# Patient Record
Sex: MALE | Race: WHITE
[De-identification: names, ages, dates, MRNs, and addresses within clinical notes are randomized per-mention and may not be internally consistent; named-entity substitution may affect disease eponyms.]

---

## 2018-05-26 ENCOUNTER — HOSPITAL ENCOUNTER (OUTPATIENT)
Dept: HOSPITAL 17 - NEPE | Age: 78
Setting detail: OBSERVATION
LOS: 1 days | Discharge: HOME | End: 2018-05-27
Attending: HOSPITALIST | Admitting: HOSPITALIST
Payer: MEDICARE

## 2018-05-26 VITALS — WEIGHT: 131.18 LBS | HEIGHT: 64 IN | BODY MASS INDEX: 22.39 KG/M2

## 2018-05-26 VITALS
OXYGEN SATURATION: 97 % | DIASTOLIC BLOOD PRESSURE: 71 MMHG | HEART RATE: 98 BPM | TEMPERATURE: 98.1 F | SYSTOLIC BLOOD PRESSURE: 137 MMHG | RESPIRATION RATE: 20 BRPM

## 2018-05-26 VITALS
TEMPERATURE: 99 F | SYSTOLIC BLOOD PRESSURE: 152 MMHG | DIASTOLIC BLOOD PRESSURE: 77 MMHG | RESPIRATION RATE: 16 BRPM | OXYGEN SATURATION: 96 % | HEART RATE: 106 BPM

## 2018-05-26 VITALS
DIASTOLIC BLOOD PRESSURE: 62 MMHG | RESPIRATION RATE: 19 BRPM | OXYGEN SATURATION: 98 % | HEART RATE: 80 BPM | SYSTOLIC BLOOD PRESSURE: 97 MMHG

## 2018-05-26 VITALS
HEART RATE: 89 BPM | SYSTOLIC BLOOD PRESSURE: 121 MMHG | OXYGEN SATURATION: 99 % | DIASTOLIC BLOOD PRESSURE: 69 MMHG | RESPIRATION RATE: 18 BRPM | TEMPERATURE: 98 F

## 2018-05-26 VITALS
RESPIRATION RATE: 18 BRPM | OXYGEN SATURATION: 99 % | DIASTOLIC BLOOD PRESSURE: 55 MMHG | SYSTOLIC BLOOD PRESSURE: 110 MMHG | TEMPERATURE: 99.2 F | HEART RATE: 100 BPM

## 2018-05-26 VITALS
OXYGEN SATURATION: 95 % | SYSTOLIC BLOOD PRESSURE: 112 MMHG | DIASTOLIC BLOOD PRESSURE: 56 MMHG | RESPIRATION RATE: 19 BRPM | HEART RATE: 89 BPM

## 2018-05-26 VITALS
SYSTOLIC BLOOD PRESSURE: 98 MMHG | RESPIRATION RATE: 18 BRPM | OXYGEN SATURATION: 97 % | HEART RATE: 86 BPM | DIASTOLIC BLOOD PRESSURE: 57 MMHG

## 2018-05-26 DIAGNOSIS — I25.10: ICD-10-CM

## 2018-05-26 DIAGNOSIS — R73.03: ICD-10-CM

## 2018-05-26 DIAGNOSIS — I50.9: ICD-10-CM

## 2018-05-26 DIAGNOSIS — R07.89: Primary | ICD-10-CM

## 2018-05-26 DIAGNOSIS — R07.2: ICD-10-CM

## 2018-05-26 DIAGNOSIS — I25.2: ICD-10-CM

## 2018-05-26 DIAGNOSIS — Z95.1: ICD-10-CM

## 2018-05-26 DIAGNOSIS — G12.1: ICD-10-CM

## 2018-05-26 DIAGNOSIS — Z88.8: ICD-10-CM

## 2018-05-26 DIAGNOSIS — I48.0: ICD-10-CM

## 2018-05-26 DIAGNOSIS — R82.99: ICD-10-CM

## 2018-05-26 DIAGNOSIS — R06.02: ICD-10-CM

## 2018-05-26 DIAGNOSIS — B96.89: ICD-10-CM

## 2018-05-26 DIAGNOSIS — I11.0: ICD-10-CM

## 2018-05-26 DIAGNOSIS — Z95.3: ICD-10-CM

## 2018-05-26 DIAGNOSIS — D72.829: ICD-10-CM

## 2018-05-26 DIAGNOSIS — I48.2: ICD-10-CM

## 2018-05-26 DIAGNOSIS — R06.09: ICD-10-CM

## 2018-05-26 DIAGNOSIS — Z95.5: ICD-10-CM

## 2018-05-26 DIAGNOSIS — E78.5: ICD-10-CM

## 2018-05-26 DIAGNOSIS — I73.9: ICD-10-CM

## 2018-05-26 DIAGNOSIS — Z79.01: ICD-10-CM

## 2018-05-26 DIAGNOSIS — E78.00: ICD-10-CM

## 2018-05-26 LAB
ALBUMIN SERPL-MCNC: 4.1 GM/DL (ref 3.4–5)
ALP SERPL-CCNC: 62 U/L (ref 45–117)
ALT SERPL-CCNC: 23 U/L (ref 12–78)
AST SERPL-CCNC: 29 U/L (ref 15–37)
BASOPHILS # BLD AUTO: 0 TH/MM3 (ref 0–0.2)
BASOPHILS NFR BLD: 0.2 % (ref 0–2)
BILIRUB SERPL-MCNC: 2.3 MG/DL (ref 0.2–1)
BUN SERPL-MCNC: 12 MG/DL (ref 7–18)
CALCIUM SERPL-MCNC: 9.3 MG/DL (ref 8.5–10.1)
CHLORIDE SERPL-SCNC: 98 MEQ/L (ref 98–107)
CREAT SERPL-MCNC: 0.77 MG/DL (ref 0.6–1.3)
EOSINOPHIL # BLD: 0 TH/MM3 (ref 0–0.4)
EOSINOPHIL NFR BLD: 0.1 % (ref 0–4)
ERYTHROCYTE [DISTWIDTH] IN BLOOD BY AUTOMATED COUNT: 14.7 % (ref 11.6–17.2)
GFR SERPLBLD BASED ON 1.73 SQ M-ARVRAT: 98 ML/MIN (ref 89–?)
GLUCOSE SERPL-MCNC: 139 MG/DL (ref 74–106)
HCO3 BLD-SCNC: 27.2 MEQ/L (ref 21–32)
HCT VFR BLD CALC: 37.1 % (ref 39–51)
HGB BLD-MCNC: 12.8 GM/DL (ref 13–17)
INR PPP: 1.1 RATIO
LYMPHOCYTES # BLD AUTO: 0.5 TH/MM3 (ref 1–4.8)
LYMPHOCYTES NFR BLD AUTO: 3.6 % (ref 9–44)
MAGNESIUM SERPL-MCNC: 2.2 MG/DL (ref 1.5–2.5)
MCH RBC QN AUTO: 29 PG (ref 27–34)
MCHC RBC AUTO-ENTMCNC: 34.4 % (ref 32–36)
MCV RBC AUTO: 84.5 FL (ref 80–100)
MONOCYTE #: 2.5 TH/MM3 (ref 0–0.9)
MONOCYTES NFR BLD: 17.4 % (ref 0–8)
NEUTROPHILS # BLD AUTO: 11.6 TH/MM3 (ref 1.8–7.7)
NEUTROPHILS NFR BLD AUTO: 78.7 % (ref 16–70)
PLATELET # BLD: 202 TH/MM3 (ref 150–450)
PMV BLD AUTO: 8.1 FL (ref 7–11)
PROT SERPL-MCNC: 8 GM/DL (ref 6.4–8.2)
PROTHROMBIN TIME: 11.4 SEC (ref 9.8–11.6)
RBC # BLD AUTO: 4.4 MIL/MM3 (ref 4.5–5.9)
SODIUM SERPL-SCNC: 137 MEQ/L (ref 136–145)
TROPONIN I SERPL-MCNC: (no result) NG/ML (ref 0.02–0.05)
TROPONIN I SERPL-MCNC: (no result) NG/ML (ref 0.02–0.05)
WBC # BLD AUTO: 14.7 TH/MM3 (ref 4–11)

## 2018-05-26 PROCEDURE — G0378 HOSPITAL OBSERVATION PER HR: HCPCS

## 2018-05-26 PROCEDURE — 87186 SC STD MICRODIL/AGAR DIL: CPT

## 2018-05-26 PROCEDURE — 82552 ASSAY OF CPK IN BLOOD: CPT

## 2018-05-26 PROCEDURE — 87086 URINE CULTURE/COLONY COUNT: CPT

## 2018-05-26 PROCEDURE — 85027 COMPLETE CBC AUTOMATED: CPT

## 2018-05-26 PROCEDURE — 82550 ASSAY OF CK (CPK): CPT

## 2018-05-26 PROCEDURE — 85730 THROMBOPLASTIN TIME PARTIAL: CPT

## 2018-05-26 PROCEDURE — 71045 X-RAY EXAM CHEST 1 VIEW: CPT

## 2018-05-26 PROCEDURE — 87077 CULTURE AEROBIC IDENTIFY: CPT

## 2018-05-26 PROCEDURE — 85025 COMPLETE CBC W/AUTO DIFF WBC: CPT

## 2018-05-26 PROCEDURE — 84484 ASSAY OF TROPONIN QUANT: CPT

## 2018-05-26 PROCEDURE — 83735 ASSAY OF MAGNESIUM: CPT

## 2018-05-26 PROCEDURE — 80048 BASIC METABOLIC PNL TOTAL CA: CPT

## 2018-05-26 PROCEDURE — 81001 URINALYSIS AUTO W/SCOPE: CPT

## 2018-05-26 PROCEDURE — 85610 PROTHROMBIN TIME: CPT

## 2018-05-26 PROCEDURE — 93005 ELECTROCARDIOGRAM TRACING: CPT

## 2018-05-26 PROCEDURE — 80053 COMPREHEN METABOLIC PANEL: CPT

## 2018-05-26 RX ADMIN — METOPROLOL TARTRATE SCH MG: 25 TABLET, FILM COATED ORAL at 23:00

## 2018-05-26 RX ADMIN — APIXABAN SCH MG: 5 TABLET, FILM COATED ORAL at 23:00

## 2018-05-26 RX ADMIN — STANDARDIZED SENNA CONCENTRATE AND DOCUSATE SODIUM SCH TAB: 8.6; 5 TABLET, FILM COATED ORAL at 21:00

## 2018-05-26 RX ADMIN — RANOLAZINE SCH MG: 500 TABLET, FILM COATED, EXTENDED RELEASE ORAL at 23:00

## 2018-05-26 RX ADMIN — Medication SCH ML: at 23:00

## 2018-05-26 NOTE — MB
cc:

Arun Davis MD

****

 

 

DATE:

05/26/2018

 

REASON FOR CONSULTATION:

Chest pain.

 

HISTORY OF PRESENT ILLNESS:

The patient is a 77-year-old white male, followed in our office by Dr. Carroll Randolph, with a history of paroxysmal atrial fibrillation, 

coronary artery disease, diabetes, hypertension, hyperlipidemia, 

aortic valve replacement, and peripheral vascular disease, who was in 

his usual state of health up until 2 days prior to admission when he 

began to experience intermittent episodes of left parasternal chest 

discomfort.  The patient states he has had intermittent similar chest 

discomforts for the past couple of years, but almost always relieved 

with 0 to 1 sublingual nitroglycerin.  However, in the last 2 days, he

has had to take multiple sublingual nitroglycerin, so he came to the 

emergency room for further evaluation and treatment.  Today, he feels 

"just fine," without any further chest discomfort.  He denies 

pleurisy, shortness of breath, dizziness, syncope, near syncope, 

palpitations, pedal edema, and paroxysmal nocturnal dyspnea.

 

PAST MEDICAL HISTORY:

1.  Paroxysmal atrial fibrillation, status post ablation in 2012.

2.  Coronary artery disease, status post bypass surgery in 1995 with a

left internal mammary artery to the LAD and 3 separate vein grafts to 

the obtuse marginal, diagonal, and posterior descending artery.  The 

patient is status post stent of the proximal vein graft to the right 

coronary artery, 06/13/2012, as well as very difficult and complicated

angioplasty of the anastomosis site of the vein graft to the obtuse 

marginal, 07/20/2016, at which time stenting was unsuccessful.

3.  Borderline diabetes.

4.  Hypertension.

5.  Hyperlipidemia.

6.  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

severe aortic stenosis.

7.  Peripheral vascular disease with remote history of left iliac 

stent.

 

CARDIAC MEDICATIONS AT HOME:

1.  Crestor 40 mg at bedtime.

2.  Plavix 75 mg daily.

3.  Metoprolol tartrate 25 mg b.i.d.

4.  Imdur 60 mg daily.

5.  Amlodipine 2.5 mg daily.

6.  Aspirin 81 mg daily.

7.  Sublingual nitroglycerin p.r.n.

 

ALLERGIES:

MORPHINE.

 

FAMILY HISTORY:

Noncontributory.

 

SOCIAL HISTORY:

The patient quit smoking more than 20 years ago.  He denies alcohol 

abuse.

 

REVIEW OF SYSTEMS:

As in the history of present illness, otherwise negative or 

noncontributory.  He also denies headache, abdominal pain, melena, 

dyspepsia, flu symptoms, cough, and fevers.

 

PHYSICAL EXAMINATION:

VITAL SIGNS:  His blood pressure 97/62 with a pulse of 80, 

respirations 19.

GENERAL:  He is a well-developed, thin white male, in no acute 

distress.

NECK:  Jugular venous pressure is normal.  Carotid pulses are 2+ 

bilaterally and without bruits.

CHEST:  Reveals diminished breath sounds diffusely.

CARDIAC:  He has an irregularly irregular rhythm with a grade II/VI 

systolic ejection murmur heard at the base of the heart.  The S2 heart

sound is normal.  No gallop is audible.

ABDOMEN:  He has a soft, nontender abdomen.  Bowel sounds are present.

 There is no definite hepatosplenomegaly.

EXTREMITIES:  Reveals no clubbing, cyanosis or edema.

 

DIAGNOSTIC STUDIES:

EKG is currently not in the computer and I am unable to locate it in 

the emergency department.

 

Chest x-ray shows no acute disease.

 

LABORATORY DATA:

Includes WBC 14.7, hemoglobin 12.8, platelets 202.  Potassium 3.8, BUN

12, creatinine 0.77.  Troponin less than 0.02.  , CK-MB 7.1.

 

IMPRESSION:

Possible unstable angina, recurrent atrial fibrillation in this 

77-year-old white male with a history of paroxysmal atrial 

fibrillation status post ablation in 2012, coronary artery disease 

status post bypass surgery in 1995, hypertension, aortic valve 

replacement, and peripheral vascular disease.  At this time, he is 

chest pain free.  Initial cardiac enzymes are overall negative for 

myocardial infarction.  His symptoms are overall suggestive of 

recurrent angina in an unstable pattern.  Unfortunately, his EKG is 

not yet in the computer and I am unable to locate it in the emergency 

department.  It reportedly shows atrial fibrillation.  His heart rate 

at this time is under control.  The patient's thromboembolic risk is 

overall at least moderately increased.

 

With respect to his medical therapy, his blood pressures most recently

are somewhat too low to intensify his antianginal regimen  except for 

possibly adding Ranexa.  From what I can gather, his last percutaneous

graft intervention 2 years ago was extremely difficult and complicated

due to angulation of the native obtuse marginal with the graft.  The 

patient does at this time wish conservative therapy as possible.

 

RECOMMENDATIONS:

1.  Start Eliquis 5 mg b.i.d. providing his EKG does indeed show 

recurrent atrial fibrillation.

2.  Continue daily baby aspirin and stop his Plavix.

3.  Consider the addition of Ranexa.

4.  Depending on his hospital course, consider repeat cardiac 

catheterization, although at this time, the patient seems to desire 

conservative therapy.

 

 

__________________________________

MD ROBBIN Riley/MERI

D: 05/26/2018, 03:22 PM

T: 05/26/2018, 03:47 PM

Visit #: [id number]

Job #: [id number]

KEVIN

## 2018-05-26 NOTE — RADRPT
EXAM DATE:  5/26/2018 11:58 AM EDT

AGE/SEX:        77 years / Male



INDICATIONS:  Shortness of breath and mid-chest pain.



CLINICAL DATA:  This is the patient's initial encounter. Patient reports that signs and symptoms have
 been present for 2 days and indicates a pain score of 6/10. 

                                                                          

MEDICAL/SURGICAL HISTORY:       None. CABG. Valve replacement.



[place]      [facility] [place].  .



FINDINGS:  

A single AP view of the chest demonstrates the lungs to be symmetrically aerated without evidence of 
mass, infiltrate or effusion.  The heart size is stable. There is evidence of previous cardiothoracic
 surgery..  Osseous structures are intact.  No new or significant changes.





CONCLUSION: 

No acute intrathoracic disease. Stable examination.



Electronically signed by: Jozef Vásquez MD  5/26/2018 11:59 AM EDT

## 2018-05-26 NOTE — HHI.PR
Subjective


Remarks


F/U UA. Pt wishes conservative mgt per cards





Objective


Vitals





Vital Signs








  Date Time  Temp Pulse Resp B/P (MAP) Pulse Ox O2 Delivery O2 Flow Rate FiO2


 


5/26/18 23:14 99.0 106 16 152/77 (102) 96   


 


5/26/18 19:15 99.2 100 18 110/55 (73) 99   


 


5/26/18 15:50 98.0 89 18 121/69 (86) 99   


 


5/26/18 14:43        


 


5/26/18 14:00  80 19 97/62 (74) 98 Room Air  


 


5/26/18 12:00  86 18 98/57 (71) 97 Room Air  


 


5/26/18 11:26     95 Room Air  


 


5/26/18 11:26  89 19 112/56 (74) 95 Room Air  


 


5/26/18 11:13  97 20  95 Room Air  


 


5/26/18 10:54 98.1 98 20 137/71 (93) 97   








Result Diagram:  


5/26/18 1115                                                                   

             5/26/18 1115





Imaging





Last Impressions








Chest X-Ray 5/26/18 1119 Signed





Impressions: 





 CONCLUSION: 





 No acute intrathoracic disease. Stable examination.





  





 








Objective Remarks


GENERAL: Elderly male, laying in bed appears comfortable.


SKIN: No rashes, ecchymoses or lesions. Cool and dry.


HEAD: Atraumatic. Normocephalic. No temporal or scalp tenderness.


EYES: Pupils equal round and reactive. Extraocular motions intact. No scleral 

icterus. No injection or drainage. 


ENT: Nose without drainage. Airway patent.


NECK: Trachea midline. 


CARDIOVASCULAR: Regular rate and rhythm with 2/6 systolic ejection murmur 


RESPIRATORY: Clear to auscultation. Breath sounds equal bilaterally. No wheezes


GASTROINTESTINAL: Abdomen soft, non-tender, nondistended.  No guarding.


MUSCULOSKELETAL: Extremities without edema.5/5 muscle strength in upper and 

lower extremities.  He does have decreased sensation in his lower extremities 

which is chronic for him.


NEUROLOGICAL: Awake and alert.  He does have a muffled voice which is chronic 

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


Procedures


none





A/P


Problem List:  


(1) Chest pain


ICD Code:  R07.9 - Chest pain, unspecified


Status:  Acute


Assessment and Plan


Chest pain/UA: Patient with extensive CAD status post CABG 2 and multiple 

cardiac catheterizations. Med mgt per cards as pt wishes conservative mgt. Ct 

beta blocker and aspirin.  Ranexa added





Patient denies any history of hypertension, however, per med rec he does have a 

history and he is on amlodipine at home.  Monitor.  Vasotec as needed





hyperlipidemia.  Patient statin was resumed.





Fib with CVR on BB. Eliquis started by cards





PAD: Asa dc plavix per cards since Elquis added





Leukocytosis: No signs of infection, or upper respiratory symptoms.  Check UA.  

Chest x-ray negative.





DVT prophylaxis: Lovenox





Problem Qualifiers





(1) Chest pain:  


Qualified Codes:  R07.9 - Chest pain, unspecified








Kendrick Bullard MD May 26, 2018 23:40

## 2018-05-26 NOTE — PD
HPI


Chief Complaint:  Cardiac Complaint


Time Seen by Provider:  11:08


Travel History


International Travel<30 days:  No


Contact w/Intl Traveler<30days:  No


Traveled to known affect area:  No





History of Present Illness


HPI


The patient is a 77-year-old  male who presents to emergency 

department with chest pain and exertional dyspnea.  The patient notes 2-3 day 

history of increasing substernal chest pain, described as pressure, nonradiating

, worse with activity and alleviated at rest.  He also notes increase in 

exertional shortness of breath.  The patient does have a history of previous 

CABG and stent placement, is followed by his cardiologist, Dr. Carroll Randolph.  

The patient's primary physician is Dr. Feliciano Morillo.  The patient denies 

any nausea, vomiting, or diaphoresis.  He denies any significant lower 

extremity edema.  The patient does have a history of hypertension, 

hyperlipidemia, CAD with previous CABG and stent placement, as well as a 

history of peripheral vascular disease.  Symptoms are moderate.  He denies any 

history of diabetes.





PFSH


Past Medical History


Arthritis:  No


Asthma:  No


Autoimmune Disease:  No


Blood Disorders:  No


Anxiety:  No


Depression:  No


Heart Rhythm Problems:  No


Cancer:  No


Cardiac Catheterization:  Yes


Cardiovascular Problems:  Yes


High Cholesterol:  Yes


Chemotherapy:  No


Chest Pain:  No


Congestive Heart Failure:  Yes


COPD:  No


Cerebrovascular Accident:  No


Diminished Hearing:  No


Endocrine:  Yes


Gastrointestinal Disorders:  Yes (APPENDECTOMY 1964)


GERD:  No


Glaucoma:  No


Genitourinary:  No


Headaches:  No


Hepatitis:  No


Hiatal Hernia:  No


Hypertension:  Yes


Immune Disorder:  No


Kidney Stones:  No


Musculoskeletal:  Yes


Neurologic:  No


Psychiatric:  No


Reproductive:  No


Respiratory:  No


Integumentary:  No


Migraines:  No


Myocardial Infarction:  Yes (1984 AND 2008)


Radiation Therapy:  No


Renal Failure:  No


Seizures:  No


Sickle Cell Disease:  No


Sleep Apnea:  No


Thyroid Disease:  No


Ulcer:  No





Past Surgical History


Abdominal Surgery:  Yes (APPENDECTOMY 1964)


AICD:  No


Appendectomy:  Yes


Arteriovenous Shunt:  No


Body Medical Devices:  STENT LEFT LEG


Cardiac Surgery:  Yes (FEMORAL BYPASS)


Cholecystectomy:  No


Coronary Artery Bypass Graft:  Yes


Ear Surgery:  No


Endocrine Surgery:  No


Eye Surgery:  Yes (BILAT. CATARACTS WITH IOL'S 2008)


Genitourinary Surgery:  No


Gynecologic Surgery:  No


Insulin Pump:  No


Joint Replacement:  No


Neurologic Surgery:  Yes (LAMINECTOMY 1971 AND 1973)


Oral Surgery:  No


Pacemaker:  No


Thoracic Surgery:  Yes (CABG)


Other Surgery:  Yes (APPENDECTOMY,BACK,FEM-POP, ABLATION)





Social History


Alcohol Use:  Yes (occas)


Tobacco Use:  No (quit 20 yrs ago)


Substance Use:  No





Allergies-Medications


(Allergen,Severity, Reaction):  


Coded Allergies:  


     morphine (Unverified  Allergy, Mild, 5/26/18)


Reported Meds & Prescriptions





Reported Meds & Active Scripts


Active


Reported


Aspirin 81 Mg Chew 81 Mg CHEW DAILY


Nitroglycerin SL (Nitroglycerin) 0.4 Mg Subl 0.4 Mg SL AS DIRECTED PRN


     ONE TABLET UNDER THE TONGUE AS NEEDED FOR CHEST PAIN, MAY REPEAT EVERY


     FIVE MINUTES FOR A TOTAL OF 3 DOSES OR CALL 911 IF NO RELIEF


Amlodipine (Amlodipine Besylate) 2.5 Mg Tab 2.5 Mg PO DAILY


Isosorbide Mononitrate ER (Isosorbide Mononitrate) 60 Mg Tab 60 Mg PO DAILY


Metoprolol Tartrate 25 Mg Tab 25 Mg PO BID


Plavix (Clopidogrel Bisulfate) 75 Mg Tab 75 Mg PO DAILY


Rosuvastatin (Rosuvastatin Calcium) 40 Mg Tab 40 Mg PO DAILY








Review of Systems


Except as stated in HPI:  all other systems reviewed are Neg


General / Constitutional:  No: Fever


HENT:  No: Lightheadedness


Cardiovascular:  Positive: Chest Pain or Discomfort, Dyspnea on exertion, No: 

Diaphoresis


Respiratory:  Positive: Shortness of Breath


Gastrointestinal:  No: Nausea, Vomiting, Abdominal Pain


Musculoskeletal:  No: Weakness, Edema


Neurologic:  No: Weakness, Dizziness





Physical Exam


Narrative


GENERAL: Awake, alert, pleasant 77-year-old male who appears his stated age and 

is in no acute respiratory distress.


SKIN: Focused skin assessment warm/dry.


HEAD: Atraumatic. Normocephalic. 


EYES: No injection or drainage.


ENT: No nasal bleeding or discharge.  Mucous membranes pink and moist.


NECK: Trachea midline. No JVD. 


CARDIOVASCULAR: Irregularly irregular.  Systolic murmur noted.  Well-healed 

midline sternal scar.


RESPIRATORY: No accessory muscle use. Clear to auscultation. Breath sounds 

equal bilaterally. 


GASTROINTESTINAL: Abdomen soft, non-tender, nondistended.  No rebound 

tenderness.


MUSCULOSKELETAL: No obvious deformities. No clubbing.  No cyanosis.  No edema. 


NEUROLOGICAL: Awake and alert. No obvious cranial nerve deficits.  Motor 

grossly within normal limits. Normal speech.


PSYCHIATRIC: Appropriate mood and affect; insight and judgment normal.





Data


Data


Last Documented VS





Vital Signs








  Date Time  Temp Pulse Resp B/P (MAP) Pulse Ox O2 Delivery O2 Flow Rate FiO2


 


5/26/18 11:26     95 Room Air  


 


5/26/18 11:26  89 19     


 


5/26/18 10:54 98.1       








Orders





 Orders


Electrocardiogram (5/26/18 11:19)


Ckmb (Isoenzyme) Profile (5/26/18 11:19)


Complete Blood Count With Diff (5/26/18 11:19)


Comprehensive Metabolic Panel (5/26/18 11:19)


Magnesium (Mg) (5/26/18 11:19)


Prothrombin Time / Inr (Pt) (5/26/18 11:19)


Act Partial Throm Time (Ptt) (5/26/18 11:19)


Troponin I (5/26/18 11:19)


Chest, Single Ap (5/26/18 11:19)


Ecg Monitoring (5/26/18 11:19)


Bilateral Bp Monitoring (5/26/18 11:19)


Iv Access Insert/Monitor (5/26/18 11:19)


Oximetry (5/26/18 11:19)


Oxygen Administration (5/26/18 11:19)


Aspirin Chew (Aspirin Chew) (5/26/18 11:30)


Sodium Chloride 0.9% Flush (Ns Flush) (5/26/18 11:30)


Aspirin Chew (Aspirin Chew) (5/26/18 12:00)


CKMB (5/26/18 11:15)


CKMB% (5/26/18 11:15)


Admit Order (Ed Use Only) (5/26/18 12:33)





Labs





Laboratory Tests








Test


  5/26/18


11:15


 


White Blood Count 14.7 TH/MM3 


 


Red Blood Count 4.40 MIL/MM3 


 


Hemoglobin 12.8 GM/DL 


 


Hematocrit 37.1 % 


 


Mean Corpuscular Volume 84.5 FL 


 


Mean Corpuscular Hemoglobin 29.0 PG 


 


Mean Corpuscular Hemoglobin


Concent 34.4 % 


 


 


Red Cell Distribution Width 14.7 % 


 


Platelet Count 202 TH/MM3 


 


Mean Platelet Volume 8.1 FL 


 


Neutrophils (%) (Auto) 78.7 % 


 


Lymphocytes (%) (Auto) 3.6 % 


 


Monocytes (%) (Auto) 17.4 % 


 


Eosinophils (%) (Auto) 0.1 % 


 


Basophils (%) (Auto) 0.2 % 


 


Neutrophils # (Auto) 11.6 TH/MM3 


 


Lymphocytes # (Auto) 0.5 TH/MM3 


 


Monocytes # (Auto) 2.5 TH/MM3 


 


Eosinophils # (Auto) 0.0 TH/MM3 


 


Basophils # (Auto) 0.0 TH/MM3 


 


CBC Comment AUTO DIFF 


 


Differential Comment


  AUTO DIFF


CONFIRMED


 


Prothrombin Time 11.4 SEC 


 


Prothromb Time International


Ratio 1.1 RATIO 


 


 


Activated Partial


Thromboplast Time 33.0 SEC 


 


 


Blood Urea Nitrogen 12 MG/DL 


 


Creatinine 0.77 MG/DL 


 


Random Glucose 139 MG/DL 


 


Total Protein 8.0 GM/DL 


 


Albumin 4.1 GM/DL 


 


Calcium Level 9.3 MG/DL 


 


Magnesium Level 2.2 MG/DL 


 


Alkaline Phosphatase 62 U/L 


 


Aspartate Amino Transf


(AST/SGOT) 29 U/L 


 


 


Alanine Aminotransferase


(ALT/SGPT) 23 U/L 


 


 


Total Bilirubin 2.3 MG/DL 


 


Sodium Level 137 MEQ/L 


 


Potassium Level 3.8 MEQ/L 


 


Chloride Level 98 MEQ/L 


 


Carbon Dioxide Level 27.2 MEQ/L 


 


Anion Gap 12 MEQ/L 


 


Estimat Glomerular Filtration


Rate 98 ML/MIN 


 


 


Total Creatine Kinase 206 U/L 


 


Creatine Kinase MB 7.1 NG/ML 


 


Troponin I


  LESS THAN 0.02


NG/ML











MDM


Medical Decision Making


Medical Screen Exam Complete:  Yes


Emergency Medical Condition:  Yes


Medical Record Reviewed:  Yes


Interpretation(s)


EKG reveals atrial fibrillation with a rate of 98.  Nonspecific T-wave changes.

  Wavy baseline noted.








Last Impressions








Chest X-Ray 5/26/18 1119 Signed





Impressions: 





 CONCLUSION: 





 No acute intrathoracic disease. Stable examination.





  





 








Laboratory Tests








Test


  5/26/18


11:15


 


White Blood Count 14.7 TH/MM3 


 


Red Blood Count 4.40 MIL/MM3 


 


Hemoglobin 12.8 GM/DL 


 


Hematocrit 37.1 % 


 


Mean Corpuscular Volume 84.5 FL 


 


Mean Corpuscular Hemoglobin 29.0 PG 


 


Mean Corpuscular Hemoglobin


Concent 34.4 % 


 


 


Red Cell Distribution Width 14.7 % 


 


Platelet Count 202 TH/MM3 


 


Mean Platelet Volume 8.1 FL 


 


Neutrophils (%) (Auto) 78.7 % 


 


Lymphocytes (%) (Auto) 3.6 % 


 


Monocytes (%) (Auto) 17.4 % 


 


Eosinophils (%) (Auto) 0.1 % 


 


Basophils (%) (Auto) 0.2 % 


 


Neutrophils # (Auto) 11.6 TH/MM3 


 


Lymphocytes # (Auto) 0.5 TH/MM3 


 


Monocytes # (Auto) 2.5 TH/MM3 


 


Eosinophils # (Auto) 0.0 TH/MM3 


 


Basophils # (Auto) 0.0 TH/MM3 


 


CBC Comment AUTO DIFF 


 


Differential Comment


  AUTO DIFF


CONFIRMED


 


Prothrombin Time 11.4 SEC 


 


Prothromb Time International


Ratio 1.1 RATIO 


 


 


Activated Partial


Thromboplast Time 33.0 SEC 


 


 


Blood Urea Nitrogen 12 MG/DL 


 


Creatinine 0.77 MG/DL 


 


Random Glucose 139 MG/DL 


 


Total Protein 8.0 GM/DL 


 


Albumin 4.1 GM/DL 


 


Calcium Level 9.3 MG/DL 


 


Magnesium Level 2.2 MG/DL 


 


Alkaline Phosphatase 62 U/L 


 


Aspartate Amino Transf


(AST/SGOT) 29 U/L 


 


 


Alanine Aminotransferase


(ALT/SGPT) 23 U/L 


 


 


Total Bilirubin 2.3 MG/DL 


 


Sodium Level 137 MEQ/L 


 


Potassium Level 3.8 MEQ/L 


 


Chloride Level 98 MEQ/L 


 


Carbon Dioxide Level 27.2 MEQ/L 


 


Anion Gap 12 MEQ/L 


 


Estimat Glomerular Filtration


Rate 98 ML/MIN 


 


 


Total Creatine Kinase 206 U/L 


 


Creatine Kinase MB 7.1 NG/ML 


 


Troponin I


  LESS THAN 0.02


NG/ML








Differential Diagnosis


Differential diagnosis includes acute coronary syndrome, unstable angina, 

atrial fibrillation with RVR, pleural effusion, pulmonary edema, pulmonary 

embolism, cardiomyopathy, deconditioning.


Narrative Course


IV was established, labs are drawn and sent, and the patient was placed on 

cardiac telemetry monitoring and continuous pulse oximetry monitoring.  Patient 

was administered aspirin 162 mg orally, he is pain-free in the emergency 

department, therefore, nitroglycerin was withheld.  Chest x-ray was obtained.  

Patient's EKG reveals atrial fibrillation, I reviewed his last EKG in 2016, no 

evidence of atrial fibrillation at that time.  I also reviewed Dr. Carroll Randolph'

s last cardiac catheterization from 2016 with stent placement, patient had a 99

% distal lesion, was improved to 30%.  I reviewed the patient's EMR, the 

patient underwent cardiac ablation for atrial fibrillation in 2012 by Dr. Pugh.  This does not appear to be new onset atrial fibrillation, may be 

paroxysmal.  The patient does have CAD with multiple risk factors and chest 

pain.  Therefore, patient will be 23 hour observation to the chest pain center.

  Patient may benefit from serial cardiac enzymes and further evaluation by 

nuclear medicine myocardial perfusion scan and/or evaluation by Dr. Randolph.





I discussed the patient with Dr. Mariano, chest pain center, who states the 

patient is too complex for the chest pain center.  He recommends admission 

[facility] hospitalist with consultation to Dr. Randolph.  [facility].





Physician Communication


Physician Communication


[facility]  I discussed the 

patient with Dr. Richardson who agrees with admission.





Diagnosis





 Primary Impression:  


 Chest pain


 Qualified Codes:  R07.9 - Chest pain, unspecified





Admitting Information


Admitting Physician Requests:  Observation


Condition:  Stable











Elton Muñiz MD May 26, 2018 11:25

## 2018-05-27 VITALS
DIASTOLIC BLOOD PRESSURE: 59 MMHG | TEMPERATURE: 98 F | SYSTOLIC BLOOD PRESSURE: 115 MMHG | HEART RATE: 76 BPM | OXYGEN SATURATION: 97 % | RESPIRATION RATE: 20 BRPM

## 2018-05-27 VITALS
HEART RATE: 77 BPM | SYSTOLIC BLOOD PRESSURE: 117 MMHG | DIASTOLIC BLOOD PRESSURE: 58 MMHG | TEMPERATURE: 98.1 F | OXYGEN SATURATION: 96 % | RESPIRATION RATE: 16 BRPM

## 2018-05-27 LAB
BACTERIA #/AREA URNS HPF: (no result) /HPF
BUN SERPL-MCNC: 16 MG/DL (ref 7–18)
CALCIUM SERPL-MCNC: 8.7 MG/DL (ref 8.5–10.1)
CHLORIDE SERPL-SCNC: 102 MEQ/L (ref 98–107)
COLOR UR: YELLOW
CREAT SERPL-MCNC: 0.53 MG/DL (ref 0.6–1.3)
ERYTHROCYTE [DISTWIDTH] IN BLOOD BY AUTOMATED COUNT: 14.7 % (ref 11.6–17.2)
GFR SERPLBLD BASED ON 1.73 SQ M-ARVRAT: 150 ML/MIN (ref 89–?)
GLUCOSE SERPL-MCNC: 126 MG/DL (ref 74–106)
GLUCOSE UR STRIP-MCNC: (no result) MG/DL
HCO3 BLD-SCNC: 27.5 MEQ/L (ref 21–32)
HCT VFR BLD CALC: 33.4 % (ref 39–51)
HGB BLD-MCNC: 11.3 GM/DL (ref 13–17)
HGB UR QL STRIP: (no result)
KETONES UR STRIP-MCNC: 40 MG/DL
MCH RBC QN AUTO: 28.5 PG (ref 27–34)
MCHC RBC AUTO-ENTMCNC: 33.8 % (ref 32–36)
MCV RBC AUTO: 84.1 FL (ref 80–100)
MUCOUS THREADS #/AREA URNS LPF: (no result) /LPF
NITRITE UR QL STRIP: (no result)
PLATELET # BLD: 177 TH/MM3 (ref 150–450)
PMV BLD AUTO: 7.8 FL (ref 7–11)
RBC # BLD AUTO: 3.96 MIL/MM3 (ref 4.5–5.9)
SODIUM SERPL-SCNC: 138 MEQ/L (ref 136–145)
SP GR UR STRIP: 1.02 (ref 1–1.03)
SQUAMOUS #/AREA URNS HPF: 2 /HPF (ref 0–5)
TRANS CELLS #/AREA URNS HPF: 1 /HPF
TROPONIN I SERPL-MCNC: (no result) NG/ML (ref 0.02–0.05)
URINE LEUKOCYTE ESTERASE: (no result)
WBC # BLD AUTO: 12.8 TH/MM3 (ref 4–11)
WHITE BLOOD CELL CLUMPS: (no result)

## 2018-05-27 RX ADMIN — RANOLAZINE SCH MG: 500 TABLET, FILM COATED, EXTENDED RELEASE ORAL at 09:46

## 2018-05-27 RX ADMIN — STANDARDIZED SENNA CONCENTRATE AND DOCUSATE SODIUM SCH TAB: 8.6; 5 TABLET, FILM COATED ORAL at 09:47

## 2018-05-27 RX ADMIN — Medication SCH ML: at 09:47

## 2018-05-27 RX ADMIN — APIXABAN SCH MG: 5 TABLET, FILM COATED ORAL at 09:47

## 2018-05-27 RX ADMIN — METOPROLOL TARTRATE SCH MG: 25 TABLET, FILM COATED ORAL at 09:46

## 2018-05-27 NOTE — PD.CARD.PN
Subjective


Subjective Remarks


Feels "good".  Denies recurrent CP.   Denies SOB, palpitations, dizziness.  

Slept well.  Anxious to go home.





Objective


Medications











Item Value  Date Time


 


Amlodipine 2.5 mg 5/27/18 0900





Besylate DAILY/PO 5/27/18 0947





 (Norvasc)  


 


Aspirin 81 mg 5/27/18 0900





 (Aspirin Chew) DAILY/CHEW 5/27/18 0946


 


Isosorbide 60 mg 5/27/18 0900





Mononitrate DAILY/PO 5/27/18 0947





 (Imdur)  


 


Atorvastatin 80 mg 5/27/18 0900





Calcium DAILY/PO 5/27/18 0947





 (Lipitor)  


 


Ranolazine 500 mg 5/26/18 2100





 (Ranexa) Q12HR/PO 5/27/18 0946


 


Metoprolol 25 mg 5/26/18 2100





Tartrate BID/PO 5/27/18 0946





 (Lopressor)  


 


Apixaban 5 mg 5/26/18 2100





 (Eliquis) BID/PO 5/27/18 0947











Current Medications








 Medications


  (Trade)  Dose


 Ordered  Sig/Eloy


 Route  Start Time


 Stop Time Status Last Admin


 


  (NS Flush)  2 ml  UNSCH  PRN


 IV FLUSH  5/26/18 14:00


     


 


 


  (NS Flush)  2 ml  BID


 IV FLUSH  5/26/18 21:00


    5/27/18 09:47


 


 


  (Narcan Inj)  0.4 mg  UNSCH  PRN


 IV PUSH  5/26/18 14:00


     


 


 


  (Farnaz-Colace)  1 tab  BID


 PO  5/26/18 21:00


    5/27/18 09:47


 


 


  (Milk Of


 Magnesia Liq)  30 ml  Q12H  PRN


 PO  5/26/18 14:00


     


 


 


  (Senokot)  17.2 mg  Q12H  PRN


 PO  5/26/18 14:00


     


 


 


  (Dulcolax Supp)  10 mg  DAILY  PRN


 RECTAL  5/26/18 14:00


     


 


 


  (Lactulose Liq)  30 ml  DAILY  PRN


 PO  5/26/18 14:00


     


 


 


  (Zofran  Odt)  4 mg  Q6H  PRN


 PO  5/26/18 14:00


     


 


 


  (Nitroglycerin


 2% Oint)  0.5 inch  Q6HR  PRN


 TOPICAL  5/26/18 14:00


     


 


 


  (Norvasc)  2.5 mg  DAILY


 PO  5/27/18 09:00


    5/27/18 09:47


 


 


  (Aspirin Chew)  81 mg  DAILY


 CHEW  5/27/18 09:00


    5/27/18 09:46


 


 


  (Imdur)  60 mg  DAILY


 PO  5/27/18 09:00


    5/27/18 09:47


 


 


  (Lopressor)  25 mg  BID


 PO  5/26/18 21:00


    5/27/18 09:46


 


 


  (Lipitor)  80 mg  DAILY


 PO  5/27/18 09:00


    5/27/18 09:47


 


 


  (Ativan Inj)  1 mg  Q2H  PRN


 IV PUSH  5/26/18 14:45


     


 


 


  (Vasotec Inj)  1.25 mg  Q6H  PRN


 IV PUSH  5/26/18 14:45


     


 


 


  (Ranexa)  500 mg  Q12HR


 PO  5/26/18 21:00


    5/27/18 09:46


 


 


  (Eliquis)  5 mg  BID


 PO  5/26/18 21:00


    5/27/18 09:47


 


 


  (fentaNYL INJ)  25 mcg  Q4H  PRN


 IV PUSH  5/26/18 15:30


     


 








Vital Signs / I&O





Vital Signs








  Date Time  Temp Pulse Resp B/P (MAP) Pulse Ox O2 Delivery O2 Flow Rate FiO2


 


5/27/18 07:42 98.0 76 20 115/59 (77) 97   


 


5/27/18 03:54 98.1 77 16 117/58 (77) 96   


 


5/26/18 23:14 99.0 106 16 152/77 (102) 96   


 


5/26/18 19:15 99.2 100 18 110/55 (73) 99   


 


5/26/18 15:50 98.0 89 18 121/69 (86) 99   


 


5/26/18 14:43        


 


5/26/18 14:00  80 19 97/62 (74) 98 Room Air  


 


5/26/18 12:00  86 18 98/57 (71) 97 Room Air  








Physical Exam


GENERAL: Well developed, thin. No acute distress.


HEENT: Jugular venous pressure is normal. 


CHEST: Lungs clear to auscultation bilaterally. Unlabored respiratory effort.


CARDIAC: Regular rate and rhythm without S3, S4.  II/VI KERON base.  Normal S2.


ABDOMEN: Soft, nontender, no hepatosplenomegaly. Bowel sounds present.


EXTREMITIES: No clubbing, cyanosis, or edema.


Laboratory





Laboratory Tests








Test


  5/26/18


19:57 5/27/18


01:14 5/27/18


10:30


 


Total Creatine Kinase 162 U/L  160 U/L  


 


Troponin I


  LESS THAN 0.02


NG/ML LESS THAN 0.02


NG/ML 


 


 


White Blood Count  12.8 TH/MM3  


 


Red Blood Count  3.96 MIL/MM3  


 


Hemoglobin  11.3 GM/DL  


 


Hematocrit  33.4 %  


 


Mean Corpuscular Volume  84.1 FL  


 


Mean Corpuscular Hemoglobin  28.5 PG  


 


Mean Corpuscular Hemoglobin


Concent 


  33.8 % 


  


 


 


Red Cell Distribution Width  14.7 %  


 


Platelet Count  177 TH/MM3  


 


Mean Platelet Volume  7.8 FL  


 


Blood Urea Nitrogen  16 MG/DL  


 


Creatinine  0.53 MG/DL  


 


Random Glucose  126 MG/DL  


 


Calcium Level  8.7 MG/DL  


 


Sodium Level  138 MEQ/L  


 


Potassium Level  3.6 MEQ/L  


 


Chloride Level  102 MEQ/L  


 


Carbon Dioxide Level  27.5 MEQ/L  


 


Anion Gap  9 MEQ/L  


 


Estimat Glomerular Filtration


Rate 


  150 ML/MIN 


  


 


 


Urine Color   YELLOW 


 


Urine Turbidity   HAZY 


 


Urine pH   6.0 


 


Urine Specific Gravity   1.019 


 


Urine Protein   30 mg/dL 


 


Urine Glucose (UA)   NEG mg/dL 


 


Urine Ketones   40 mg/dL 


 


Urine Occult Blood   SMALL 


 


Urine Nitrite   NEG 


 


Urine Bilirubin   NEG 


 


Urine Urobilinogen


  


  


  LESS THAN 2.0


MG/DL


 


Urine Leukocyte Esterase   LARGE 


 


Urine RBC   14 /hpf 


 


Urine WBC    /hpf 


 


Urine WBC Clumps   FEW 


 


Urine Squamous Epithelial


Cells 


  


  2 /hpf 


 


 


Urine Transitional Epithelial


Cells 


  


  1 /hpf 


 


 


Urine Calcium Oxalate Crystals   OCC /hpf 


 


Urine Bacteria   MANY /hpf 


 


Urine Mucus   MANY /lpf 


 


Microscopic Urinalysis Comment


  


  


  CULTURE


INDICATED











Assessment and Plan


Problem List:  


(1) CAD (coronary artery disease)


ICD Codes:  I25.10 - Atherosclerotic heart disease of native coronary artery 

without angina pectoris


Status:  Chronic


Plan:  Stable since admission.  No further chest discomfort.   Cardiac enzymes 

remain negative for acute MI.  Recommend continued medical therapy of his CAD.  

Continue the added Ranexa.  OK to discharge today from a cardiac standpoint.





(2) Paroxysmal atrial fibrillation


ICD Codes:  I48.0 - Paroxysmal atrial fibrillation


Status:  Acute


Plan:  Recurrent atrial fib demonstrated by admission EKG.  Now back in NSR.  

Patient's thromboembolic risk moderately increased.  Recommend continue 

apixaban (stop Plavix and continue daily baby aspirin).





(3) Hypertension


ICD Codes:  I10 - Essential (primary) hypertension


Status:  Chronic


Plan:  Stable.   Normotensive.





(4) History of aortic valve replacement


ICD Codes:  Z95.2 - Presence of prosthetic heart valve


Status:  Chronic


Plan:  Stable.  Normal AVR function by exam.





Code Status


full code


Discussed Condition With


patient





Problem Qualifiers





(1) CAD (coronary artery disease):  


Qualified Codes:  I25.119 - Atherosclerotic heart disease of native coronary 

artery with unspecified angina pectoris


(2) Hypertension:  


Qualified Codes:  I10 - Essential (primary) hypertension








Arun Davis MD May 27, 2018 12:02

## 2018-05-27 NOTE — HHI.PR
Subjective


Remarks


Follow-up chest pain.  No further episodes.  He is doing okay wants to go home.





Objective


Vitals





Vital Signs








  Date Time  Temp Pulse Resp B/P (MAP) Pulse Ox O2 Delivery O2 Flow Rate FiO2


 


5/27/18 07:42 98.0 76 20 115/59 (77) 97   


 


5/27/18 03:54 98.1 77 16 117/58 (77) 96   


 


5/26/18 23:14 99.0 106 16 152/77 (102) 96   


 


5/26/18 19:15 99.2 100 18 110/55 (73) 99   


 


5/26/18 15:50 98.0 89 18 121/69 (86) 99   


 


5/26/18 14:43        


 


5/26/18 14:00  80 19 97/62 (74) 98 Room Air  


 


5/26/18 12:00  86 18 98/57 (71) 97 Room Air  


 


5/26/18 11:26     95 Room Air  


 


5/26/18 11:26  89 19 112/56 (74) 95 Room Air  


 


5/26/18 11:13  97 20  95 Room Air  


 


5/26/18 10:54 98.1 98 20 137/71 (93) 97   








Result Diagram:  


5/27/18 0114                                                                   

             5/27/18 0114





Imaging





Last Impressions








Chest X-Ray 5/26/18 1119 Signed





Impressions: 





 CONCLUSION: 





 No acute intrathoracic disease. Stable examination.





  





 








Objective Remarks


GENERAL: Elderly male, laying in bed appears comfortable.


SKIN: No rashes, ecchymoses or lesions. Cool and dry.


CARDIOVASCULAR: Regular rate and rhythm with 2/6 systolic ejection murmur 


RESPIRATORY: Clear to auscultation. Breath sounds equal bilaterally. No wheezes


GASTROINTESTINAL: Abdomen soft, non-tender, nondistended.  No guarding.


MUSCULOSKELETAL: Extremities without edema.5/5 muscle strength in upper and 

lower extremities.  He does have decreased sensation in his lower extremities 

which is chronic for him.


NEUROLOGICAL: Awake and alert.  He does have a muffled voice which is chronic 

for him as well secondary to his Jua Nj's disease


Procedures


none





A/P


Problem List:  


(1) Chest pain


ICD Code:  R07.9 - Chest pain, unspecified


Status:  Acute


Assessment and Plan


Chest pain/UA: Patient with extensive CAD status post CABG 2 and multiple 

cardiac catheterizations.  Patient ruled out for MI currently pain-free.  He is 

stable for discharge.  Med mgt per cards as pt wishes conservative mgt. Ct beta 

blocker and aspirin.  Ranexa added





Patient denies any history of hypertension, however, per med rec he does have a 

history and he is on amlodipine at home.  Monitor.  Vasotec as needed





hyperlipidemia.  Patient statin was resumed.





Fib with CVR on BB. Eliquis started by cards





PAD: Asa dc plavix per cards since Elquis added





Leukocytosis: No signs of infection, or upper respiratory symptoms.  Chest x-

ray negative.  Improving but abnormal urinalysis start Macrodantin





DVT prophylaxis: Lovenox


Discharge Planning


Discharge patient to home


Condition on discharge: Improved


Regular Diet as tolerated


Ad Sandra activity no driving


Rx written: Eliquis, Macrodantin and Ranexa


Follow-up with primary care physician





Problem Qualifiers





(1) Chest pain:  


Qualified Codes:  R07.9 - Chest pain, unspecified








Kendrick Bullard MD May 27, 2018 09:53

## 2018-05-27 NOTE — EKG
Date Performed: 05/26/2018       Time Performed: 11:06:58

 

PTAGE:      77 years

 

EKG:      ATRIAL FIBRILLATION ST DEVIATION AND MODERATE T-WAVE ABNORMALITY, CONSIDER LATERAL ISCHEMIA
 ABNORMAL ECG INTERPRETATION BASED ON A DEFAULT AGE OF 40 YEARS 

 

 PREVIOUS TRACING            : 07/21/2016 06.21 Compared to previous tracing,atrial fib is new.

 

DOCTOR:   Molina Mariano  Interpretating Date/Time  05/27/2018 10:26:13

## 2018-05-27 NOTE — HHI.DCPOC
Discharge Care Plan


Diagnosis:  


(1) Chest pain








Your Health Problems Are: Difficulty with ADL





 Exercise Tolerance








Goals to Promote Your Health


* To prevent worsening of your condition and complications


* To maintain your health at the optimal level


Directions to Meet Your Goals


*** Take your medications as prescribed


*** Follow your dietary instruction


*** Follow activity as directed








*** Keep your appointments as scheduled


*** Take your immunizations and boosters as scheduled


*** If your symptoms worsen call your PCP, if no PCP go to Urgent Care Center 

or Emergency Room***


*** Smoking is Dangerous to Your Health. Avoid second hand smoke***


***Call the 24-hour hour crisis hotline for domestic abuse at 1-558.186.7748***











Kendrick Bullard MD May 27, 2018 09:57

## 2024-03-12 NOTE — HHI.HP
__________________________________________________





[facility]


Service


[facility]


Primary Care Physician


Feliciano Morillo, DO


Admission Diagnosis





Chest pain rule out ACS


Diagnoses:  


Travel History


International Travel<30 Days:  No


Contact w/Intl Traveler <30 Da:  No


Traveled to Known Affected Are:  No


History of Present Illness


Pt is a 77 yr old male w PMHx of hyperlipidemia, CAD s/p CABG x 2, severe 

aortic valve stenosis s/p replacement, chronic atrial fib s/p ablation,  Juan J

's disease, PAD, presented to the ED with complaints of chest pain.  He stated 

started about 2 days ago however upon insistence of his sister and him worrying 

about it he decided to come today.  He states that his chest pain was located 

mainly in the mid chest with no radiation to his jaw, ear or shoulder.  2 days 

ago his pain was a 7-8 out of 10 and worsened with movement.  He sees Dr. Randolph

, cardiologist, and last saw him a month ago.  This morning, his pain was 

improved but because he was worried he decided to come in.  Currently he has no 

pain.  He denied any associated nausea or vomiting, lightheadedness or 

dizziness.  He states that he has been told in the past that his heart is 

"really bad ".


He denies any cough, shortness of breath, abdominal pain, burning with 

urination or increased urinary frequency.  He states that he does not have much 

sensation in the lower extremities.  He does not use any walker or assisting 

devices.





Review of Systems


Except as stated in HPI:  all other systems reviewed are Neg





Past Family Social History


Past Medical History


hyperlipidemia, CAD s/p CABG x 2, severe aortic valve stenosis s/p replacement, 

chronic atrial fib s/p ablation,  Juan J's disease, PAD


Past Surgical History


CABG 2, aortic valve replacement, heart ablation, left common iliac 

angioplasty and left common iliac stenting.  Laminectomy, cataract surgery.  

Multiple cardiac catheterization with the last one being 2016.


Allergies:  


Coded Allergies:  


     morphine (Unverified  Allergy, Mild, 18)


Family History


Father had liver problems.  He was a drinker


Social History


Quit smoking about 20 years ago.  Denies any alcohol in illegal drug use.





Physical Exam


Vital Signs





Vital Signs








  Date Time  Temp Pulse Resp B/P (MAP) Pulse Ox O2 Delivery O2 Flow Rate FiO2


 


18 14:00  80 19 97/62 (74) 98 Room Air  


 


18 12:00  86 18 98/57 (71) 97 Room Air  


 


18 11:26     95 Room Air  


 


18 11:26  89 19 112/56 (74) 95 Room Air  


 


18 11:13  97 20  95 Room Air  


 


18 10:54 98.1 98 20 137/71 (93) 97   








Physical Exam


GENERAL: Elderly male, laying in bed appears comfortable.


SKIN: No rashes, ecchymoses or lesions. Cool and dry.


HEAD: Atraumatic. Normocephalic. No temporal or scalp tenderness.


EYES: Pupils equal round and reactive. Extraocular motions intact. No scleral 

icterus. No injection or drainage. 


ENT: Nose without drainage. Airway patent.


NECK: Trachea midline. 


CARDIOVASCULAR: Regular rate and rhythm with 2/6 systolic ejection murmur 


RESPIRATORY: Clear to auscultation. Breath sounds equal bilaterally. No wheezes


GASTROINTESTINAL: Abdomen soft, non-tender, nondistended.  No guarding.


MUSCULOSKELETAL: Extremities without edema.5/5 muscle strength in upper and 

lower extremities.  He does have decreased sensation in his lower extremities 

which is chronic for him.


NEUROLOGICAL: Awake and alert.  He does have a muffled voice which is chronic 

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


Laboratory





Laboratory Tests








Test


  18


11:15


 


White Blood Count 14.7 


 


Red Blood Count 4.40 


 


Hemoglobin 12.8 


 


Hematocrit 37.1 


 


Mean Corpuscular Volume 84.5 


 


Mean Corpuscular Hemoglobin 29.0 


 


Mean Corpuscular Hemoglobin


Concent 34.4 


 


 


Red Cell Distribution Width 14.7 


 


Platelet Count 202 


 


Mean Platelet Volume 8.1 


 


Neutrophils (%) (Auto) 78.7 


 


Lymphocytes (%) (Auto) 3.6 


 


Monocytes (%) (Auto) 17.4 


 


Eosinophils (%) (Auto) 0.1 


 


Basophils (%) (Auto) 0.2 


 


Neutrophils # (Auto) 11.6 


 


Lymphocytes # (Auto) 0.5 


 


Monocytes # (Auto) 2.5 


 


Eosinophils # (Auto) 0.0 


 


Basophils # (Auto) 0.0 


 


CBC Comment AUTO DIFF 


 


Differential Comment


  AUTO DIFF


CONFIRMED


 


Prothrombin Time 11.4 


 


Prothromb Time International


Ratio 1.1 


 


 


Activated Partial


Thromboplast Time 33.0 


 


 


Blood Urea Nitrogen 12 


 


Creatinine 0.77 


 


Random Glucose 139 


 


Total Protein 8.0 


 


Albumin 4.1 


 


Calcium Level 9.3 


 


Magnesium Level 2.2 


 


Alkaline Phosphatase 62 


 


Aspartate Amino Transf


(AST/SGOT) 29 


 


 


Alanine Aminotransferase


(ALT/SGPT) 23 


 


 


Total Bilirubin 2.3 


 


Sodium Level 137 


 


Potassium Level 3.8 


 


Chloride Level 98 


 


Carbon Dioxide Level 27.2 


 


Anion Gap 12 


 


Estimat Glomerular Filtration


Rate 98 


 


 


Total Creatine Kinase 206 


 


Creatine Kinase MB 7.1 


 


Troponin I LESS THAN 0.02 








Result Diagram:  


18 1115                                                                   

             18 1115





Imaging





Last Impressions








Chest X-Ray 18 1119 Signed





Impressions: 





 CONCLUSION: 





 No acute intrathoracic disease. Stable examination.





  





 











Caprini VTE Risk Assessment


Caprini VTE Risk Assessment:  Mod/High Risk (score >= 2)


Caprini Risk Assessment Model











 Point Value = 1          Point Value = 2  Point Value = 3  Point Value = 5


 


Age 41-60


Minor surgery


BMI > 25 kg/m2


Swollen legs


Varicose veins


Pregnancy or postpartum


History of unexplained or recurrent


   spontaneous 


Oral contraceptives or hormone


   replacement


Sepsis (< 1 month)


Serious lung disease, including


   pneumonia (< 1 month)


Abnormal pulmonary function


Acute myocardial infarction


Congestive heart failure (< 1 month)


History of inflammatory bowel disease


Medical patient at bed rest Age 61-74


Arthroscopic surgery


Major open surgery (> 45 min)


Laparoscopic surgery (> 45 min)


Malignancy


Confined to bed (> 72 hours)


Immobilizing plaster cast


Central venous access Age >= 75


History of VTE


Family history of VTE


Factor V Leiden


Prothrombin [id number]


Lupus anticoagulant


Anticardiolipin antibodies


Elevated serum homocysteine


Heparin-induced thrombocytopenia


Other congenital or acquired


   thrombophilia Stroke (< 1 month)


Elective arthroplasty


Hip, pelvis, or leg fracture


Acute spinal cord injury (< 1 month)








Prophylaxis Regimen











   Total Risk


Factor Score Risk Level Prophylaxis Regimen


 


0-1      Low Early ambulation


 


2 Moderate Order ONE of the following:


*Sequential Compression Device (SCD)


*Heparin 5000 units SQ BID


 


3-4 Higher Order ONE of the following medications:


*Heparin 5000 units SQ TID


*Enoxaparin/Lovenox 40 mg SQ daily (WT < 150 kg, CrCl > 30 mL/min)


*Enoxaparin/Lovenox 30 mg SQ daily (WT < 150 kg, CrCl > 10-29 mL/min)


*Enoxaparin/Lovenox 30 mg SQ BID (WT < 150 kg, CrCl > 30 mL/min)


AND/OR


*Sequential Compression Device (SCD)


 


5 or more Highest Order ONE of the following medications:


*Heparin 5000 units SQ TID (Preferred with Epidurals)


*Enoxaparin/Lovenox 40 mg SQ daily (WT < 150 kg, CrCl > 30 mL/min)


*Enoxaparin/Lovenox 30 mg SQ daily (WT < 150 kg, CrCl > 10-29 mL/min)


*Enoxaparin/Lovenox 30 mg SQ BID (WT < 150 kg, CrCl > 30 mL/min)


AND


*Sequential Compression Device (SCD)











Assessment and Plan


Assessment and Plan


Chest pain: Patient with extensive CAD status post CABG 2 and multiple cardiac 

catheterizations.  Initial troponin is negative although his CK-MB is slightly 

elevated.  Patient is known by Dr. Carroll Randolph and I will consult cardiology 

for further evaluation and recommendations.  Continue trending serial cardiac 

enzymes.  Patient has severe allergic reaction to morphine (makes him "wild") 

therefore was switched to Ativan as needed for chest pain.  I have added 

nitroglycerin paste as needed, resumed patient's beta blocker and aspirin.  

Patient did receive a dose here in the emergency room.





Patient denies any history of hypertension, however, per med rec he does have a 

history and he is on amlodipine at home.  I have resume his amlodipine.  

Monitor.  Vasotec as needed





hyperlipidemia.  Patient statin was resumed.





PAD: Plavix and aspirin resume





Leukocytosis: No signs of infection, or upper respiratory symptoms.  Check UA.  

Chest x-ray negative.





DVT prophylaxis: Lovenox


Discussed Condition With


ER physician and patient











Nellie Richardson MD May 26, 2018 14:36 3 = A little assistance